# Patient Record
Sex: FEMALE | Race: WHITE | NOT HISPANIC OR LATINO
[De-identification: names, ages, dates, MRNs, and addresses within clinical notes are randomized per-mention and may not be internally consistent; named-entity substitution may affect disease eponyms.]

---

## 2018-03-28 ENCOUNTER — RECORD ABSTRACTING (OUTPATIENT)
Age: 53
End: 2018-03-28

## 2018-03-28 DIAGNOSIS — Z78.9 OTHER SPECIFIED HEALTH STATUS: ICD-10-CM

## 2018-03-28 DIAGNOSIS — Z87.898 PERSONAL HISTORY OF OTHER SPECIFIED CONDITIONS: ICD-10-CM

## 2018-03-28 DIAGNOSIS — Z87.42 PERSONAL HISTORY OF OTHER DISEASES OF THE FEMALE GENITAL TRACT: ICD-10-CM

## 2018-03-28 DIAGNOSIS — B37.3 CANDIDIASIS OF VULVA AND VAGINA: ICD-10-CM

## 2018-03-28 PROBLEM — Z00.00 ENCOUNTER FOR PREVENTIVE HEALTH EXAMINATION: Status: ACTIVE | Noted: 2018-03-28

## 2018-05-01 ENCOUNTER — APPOINTMENT (OUTPATIENT)
Dept: OBGYN | Facility: CLINIC | Age: 53
End: 2018-05-01
Payer: COMMERCIAL

## 2018-05-01 VITALS
BODY MASS INDEX: 28.17 KG/M2 | HEIGHT: 63 IN | WEIGHT: 159 LBS | DIASTOLIC BLOOD PRESSURE: 80 MMHG | SYSTOLIC BLOOD PRESSURE: 120 MMHG

## 2018-05-01 PROCEDURE — 99213 OFFICE O/P EST LOW 20 MIN: CPT | Mod: 25

## 2018-05-01 PROCEDURE — 99396 PREV VISIT EST AGE 40-64: CPT

## 2018-05-02 LAB — HPV HIGH+LOW RISK DNA PNL CVX: NOT DETECTED

## 2018-05-05 LAB — CYTOLOGY CVX/VAG DOC THIN PREP: NORMAL

## 2018-05-08 ENCOUNTER — APPOINTMENT (OUTPATIENT)
Dept: OBGYN | Facility: CLINIC | Age: 53
End: 2018-05-08

## 2018-06-05 ENCOUNTER — RECORD ABSTRACTING (OUTPATIENT)
Age: 53
End: 2018-06-05

## 2018-10-25 ENCOUNTER — OTHER (OUTPATIENT)
Age: 53
End: 2018-10-25

## 2019-01-29 ENCOUNTER — APPOINTMENT (OUTPATIENT)
Dept: FAMILY MEDICINE | Facility: CLINIC | Age: 54
End: 2019-01-29

## 2019-11-12 ENCOUNTER — APPOINTMENT (OUTPATIENT)
Dept: OBGYN | Facility: CLINIC | Age: 54
End: 2019-11-12

## 2019-11-13 ENCOUNTER — APPOINTMENT (OUTPATIENT)
Dept: MAMMOGRAPHY | Facility: CLINIC | Age: 54
End: 2019-11-13
Payer: COMMERCIAL

## 2019-11-13 PROCEDURE — 77067 SCR MAMMO BI INCL CAD: CPT

## 2019-11-13 PROCEDURE — 77063 BREAST TOMOSYNTHESIS BI: CPT

## 2020-02-04 ENCOUNTER — APPOINTMENT (OUTPATIENT)
Dept: OBGYN | Facility: CLINIC | Age: 55
End: 2020-02-04

## 2020-10-22 ENCOUNTER — APPOINTMENT (OUTPATIENT)
Dept: OBGYN | Facility: CLINIC | Age: 55
End: 2020-10-22
Payer: COMMERCIAL

## 2020-10-22 VITALS
DIASTOLIC BLOOD PRESSURE: 70 MMHG | BODY MASS INDEX: 28.35 KG/M2 | WEIGHT: 160 LBS | SYSTOLIC BLOOD PRESSURE: 118 MMHG | TEMPERATURE: 97.3 F | HEIGHT: 63 IN

## 2020-10-22 DIAGNOSIS — R10.30 LOWER ABDOMINAL PAIN, UNSPECIFIED: ICD-10-CM

## 2020-10-22 DIAGNOSIS — Z80.3 FAMILY HISTORY OF MALIGNANT NEOPLASM OF BREAST: ICD-10-CM

## 2020-10-22 LAB
DATE COLLECTED: NORMAL
HEMOCCULT SP1 STL QL: NEGATIVE
QUALITY CONTROL: YES

## 2020-10-22 PROCEDURE — 99396 PREV VISIT EST AGE 40-64: CPT

## 2020-10-22 PROCEDURE — 99072 ADDL SUPL MATRL&STAF TM PHE: CPT

## 2020-10-22 NOTE — DISCUSSION/SUMMARY
[FreeTextEntry1] : Pap, HPv\par Mammo rx given\par Pt to f/u with PCP regarding health screening\par Pt given Dr Yanez's number, based on 32% Anish kwanck risk, maternal breast cancer hx.\par RTO in 1 year or sooner prn

## 2020-10-22 NOTE — REASON FOR VISIT
[Annual] : an annual visit. [FreeTextEntry2] : pt reports perimenopause, occassional hot flashes but states they are managable

## 2020-10-22 NOTE — PHYSICAL EXAM
[Appropriately responsive] : appropriately responsive [Alert] : alert [No Acute Distress] : no acute distress [No Lymphadenopathy] : no lymphadenopathy [Regular Rate Rhythm] : regular rate rhythm [No Murmurs] : no murmurs [Clear to Auscultation B/L] : clear to auscultation bilaterally [Soft] : soft [Non-tender] : non-tender [Non-distended] : non-distended [No HSM] : No HSM [Oriented x3] : oriented x3 [Examination Of The Breasts] : a normal appearance [No Masses] : no breast masses were palpable [Labia Majora] : normal [Labia Minora] : normal [Normal] : normal [Uterine Adnexae] : normal [No Tenderness] : no tenderness [FreeTextEntry9] : prisca vivas

## 2020-10-30 LAB
CYTOLOGY CVX/VAG DOC THIN PREP: NORMAL
HPV HIGH+LOW RISK DNA PNL CVX: NOT DETECTED

## 2020-12-16 ENCOUNTER — RESULT REVIEW (OUTPATIENT)
Age: 55
End: 2020-12-16

## 2020-12-16 ENCOUNTER — APPOINTMENT (OUTPATIENT)
Dept: MAMMOGRAPHY | Facility: CLINIC | Age: 55
End: 2020-12-16
Payer: COMMERCIAL

## 2020-12-16 PROCEDURE — 77063 BREAST TOMOSYNTHESIS BI: CPT

## 2020-12-16 PROCEDURE — 77067 SCR MAMMO BI INCL CAD: CPT

## 2020-12-18 DIAGNOSIS — Z84.89 FAMILY HISTORY OF OTHER SPECIFIED CONDITIONS: ICD-10-CM

## 2021-11-14 ENCOUNTER — NON-APPOINTMENT (OUTPATIENT)
Age: 56
End: 2021-11-14

## 2021-11-15 ENCOUNTER — APPOINTMENT (OUTPATIENT)
Dept: OBGYN | Facility: CLINIC | Age: 56
End: 2021-11-15
Payer: SELF-PAY

## 2021-11-15 VITALS — SYSTOLIC BLOOD PRESSURE: 126 MMHG | DIASTOLIC BLOOD PRESSURE: 70 MMHG

## 2021-11-15 PROCEDURE — 99396 PREV VISIT EST AGE 40-64: CPT

## 2021-11-15 NOTE — PHYSICAL EXAM
[Appropriately responsive] : appropriately responsive [Alert] : alert [No Acute Distress] : no acute distress [No Lymphadenopathy] : no lymphadenopathy [Regular Rate Rhythm] : regular rate rhythm [No Murmurs] : no murmurs [Clear to Auscultation B/L] : clear to auscultation bilaterally [Soft] : soft [Non-tender] : non-tender [Non-distended] : non-distended [No HSM] : No HSM [Oriented x3] : oriented x3 [Examination Of The Breasts] : a normal appearance [Breast Nipple Inversion] : inverted [No Masses] : no breast masses were palpable [Labia Majora] : normal [Labia Minora] : normal [No Bleeding] : There was no active vaginal bleeding [Normal] : normal [Uterine Adnexae] : normal [Declined] : Patient declined rectal exam [Tenderness] : nontender

## 2021-11-15 NOTE — HISTORY OF PRESENT ILLNESS
[Patient reported mammogram was normal] : Patient reported mammogram was normal [Patient reported PAP Smear was normal] : Patient reported PAP Smear was normal [Patient reported colonoscopy was normal] : Patient reported colonoscopy was normal [No] : Patient does not have concerns regarding sex [postmenopausal] : postmenopausal [Mammogramdate] : 2020 [PapSmeardate] : 2020 [ColonoscopyDate] : 2016 [TextBox_43] : festus [LMPDate] : 2019 [[facility] : [facility] [[facility]] : 2 [FreeTextEntry1] :  x 2

## 2021-11-18 LAB
CYTOLOGY CVX/VAG DOC THIN PREP: ABNORMAL
HPV HIGH+LOW RISK DNA PNL CVX: NOT DETECTED

## 2021-12-17 ENCOUNTER — RESULT REVIEW (OUTPATIENT)
Age: 56
End: 2021-12-17

## 2021-12-17 ENCOUNTER — APPOINTMENT (OUTPATIENT)
Dept: MAMMOGRAPHY | Facility: CLINIC | Age: 56
End: 2021-12-17
Payer: COMMERCIAL

## 2021-12-17 PROCEDURE — 77063 BREAST TOMOSYNTHESIS BI: CPT

## 2021-12-17 PROCEDURE — 77067 SCR MAMMO BI INCL CAD: CPT

## 2022-12-05 ENCOUNTER — APPOINTMENT (OUTPATIENT)
Dept: OBGYN | Facility: CLINIC | Age: 57
End: 2022-12-05

## 2022-12-05 VITALS
DIASTOLIC BLOOD PRESSURE: 80 MMHG | WEIGHT: 160 LBS | HEIGHT: 63 IN | SYSTOLIC BLOOD PRESSURE: 132 MMHG | BODY MASS INDEX: 28.35 KG/M2

## 2022-12-05 DIAGNOSIS — Z01.419 ENCOUNTER FOR GYNECOLOGICAL EXAMINATION (GENERAL) (ROUTINE) W/OUT ABNORMAL FINDINGS: ICD-10-CM

## 2022-12-05 PROCEDURE — 99396 PREV VISIT EST AGE 40-64: CPT

## 2022-12-05 NOTE — HISTORY OF PRESENT ILLNESS
[Patient reported mammogram was normal] : Patient reported mammogram was normal [Patient reported PAP Smear was normal] : Patient reported PAP Smear was normal [TextBox_4] : 57 y.o.  PMF for annual exam today.  No gyn concerns today.  , not sexually active.  No hx abnormal Pap smears or mammograms [Mammogramdate] : 12/21 [PapSmeardate] : 11/21

## 2022-12-05 NOTE — PHYSICAL EXAM
[Appropriately responsive] : appropriately responsive [Alert] : alert [No Acute Distress] : no acute distress [No Lymphadenopathy] : no lymphadenopathy [Regular Rate Rhythm] : regular rate rhythm [No Murmurs] : no murmurs [Clear to Auscultation B/L] : clear to auscultation bilaterally [Soft] : soft [Non-tender] : non-tender [Non-distended] : non-distended [No Lesions] : no lesions [Oriented x3] : oriented x3 [Examination Of The Breasts] : a normal appearance [No Masses] : no breast masses were palpable [Labia Majora] : normal [Labia Minora] : normal [Normal] : normal [Uterine Adnexae] : normal

## 2022-12-05 NOTE — PLAN
[FreeTextEntry1] : unremarkable CBE and pelvic exams\par SBE taught and encouraged monthly\par Pap collected\par Mammo ordered\par I reviewed dietary, vitamin and exercise measures for maintaining optimal bone density and patient verbalizes understanding of these recommendations.\par The importance of a screening colonoscopy was discussed. Patient had negative cologuard 5 years ago\par

## 2022-12-06 LAB — HPV HIGH+LOW RISK DNA PNL CVX: NOT DETECTED

## 2022-12-09 ENCOUNTER — RESULT REVIEW (OUTPATIENT)
Age: 57
End: 2022-12-09

## 2022-12-09 ENCOUNTER — APPOINTMENT (OUTPATIENT)
Dept: MAMMOGRAPHY | Facility: CLINIC | Age: 57
End: 2022-12-09

## 2022-12-09 PROCEDURE — 77063 BREAST TOMOSYNTHESIS BI: CPT

## 2022-12-09 PROCEDURE — 77067 SCR MAMMO BI INCL CAD: CPT

## 2022-12-14 LAB — CYTOLOGY CVX/VAG DOC THIN PREP: NORMAL

## 2023-12-07 ENCOUNTER — APPOINTMENT (OUTPATIENT)
Dept: OBGYN | Facility: CLINIC | Age: 58
End: 2023-12-07
Payer: COMMERCIAL

## 2023-12-07 VITALS
SYSTOLIC BLOOD PRESSURE: 128 MMHG | HEIGHT: 63 IN | DIASTOLIC BLOOD PRESSURE: 82 MMHG | BODY MASS INDEX: 30.12 KG/M2 | WEIGHT: 170 LBS

## 2023-12-07 DIAGNOSIS — Z83.0 FAMILY HISTORY OF HUMAN IMMUNODEFICIENCY VIRUS [HIV] DISEASE: ICD-10-CM

## 2023-12-07 DIAGNOSIS — Z86.018 PERSONAL HISTORY OF OTHER BENIGN NEOPLASM: ICD-10-CM

## 2023-12-07 DIAGNOSIS — L98.9 DISORDER OF THE SKIN AND SUBCUTANEOUS TISSUE, UNSPECIFIED: ICD-10-CM

## 2023-12-07 DIAGNOSIS — Z01.419 ENCOUNTER FOR GYNECOLOGICAL EXAMINATION (GENERAL) (ROUTINE) W/OUT ABNORMAL FINDINGS: ICD-10-CM

## 2023-12-07 PROCEDURE — 99396 PREV VISIT EST AGE 40-64: CPT

## 2023-12-07 PROCEDURE — 99212 OFFICE O/P EST SF 10 MIN: CPT | Mod: 25

## 2023-12-09 LAB — HPV HIGH+LOW RISK DNA PNL CVX: NOT DETECTED

## 2023-12-16 ENCOUNTER — NON-APPOINTMENT (OUTPATIENT)
Age: 58
End: 2023-12-16

## 2023-12-17 LAB — CYTOLOGY CVX/VAG DOC THIN PREP: NORMAL

## 2024-01-02 ENCOUNTER — APPOINTMENT (OUTPATIENT)
Dept: RADIOLOGY | Facility: CLINIC | Age: 59
End: 2024-01-02
Payer: COMMERCIAL

## 2024-01-02 ENCOUNTER — APPOINTMENT (OUTPATIENT)
Dept: ULTRASOUND IMAGING | Facility: CLINIC | Age: 59
End: 2024-01-02
Payer: COMMERCIAL

## 2024-01-02 ENCOUNTER — RESULT REVIEW (OUTPATIENT)
Age: 59
End: 2024-01-02

## 2024-01-02 ENCOUNTER — APPOINTMENT (OUTPATIENT)
Dept: MAMMOGRAPHY | Facility: CLINIC | Age: 59
End: 2024-01-02
Payer: COMMERCIAL

## 2024-01-02 PROCEDURE — 77067 SCR MAMMO BI INCL CAD: CPT

## 2024-01-02 PROCEDURE — 77063 BREAST TOMOSYNTHESIS BI: CPT

## 2024-01-02 PROCEDURE — 76641 ULTRASOUND BREAST COMPLETE: CPT | Mod: 50

## 2024-01-02 PROCEDURE — 77080 DXA BONE DENSITY AXIAL: CPT

## 2024-01-04 ENCOUNTER — APPOINTMENT (OUTPATIENT)
Dept: OBGYN | Facility: CLINIC | Age: 59
End: 2024-01-04
Payer: COMMERCIAL

## 2024-01-04 VITALS
BODY MASS INDEX: 30.12 KG/M2 | HEIGHT: 63 IN | DIASTOLIC BLOOD PRESSURE: 85 MMHG | WEIGHT: 170 LBS | SYSTOLIC BLOOD PRESSURE: 119 MMHG

## 2024-01-04 DIAGNOSIS — N60.02 SOLITARY CYST OF LEFT BREAST: ICD-10-CM

## 2024-01-04 DIAGNOSIS — N84.1 POLYP OF CERVIX UTERI: ICD-10-CM

## 2024-01-04 PROCEDURE — 57500 BIOPSY OF CERVIX: CPT

## 2024-01-04 NOTE — DISCUSSION/SUMMARY
[FreeTextEntry1] : 59yo  PMF s/p cervical polypectomy and with BIRADS 3 mammo/sono and elevated TC score   Cervical polypetomy:  - Strict precautions and aftercare instructions reviewed  - Aware she cannot have anything in vagina or be submerged in water until seen by me  - RTO in 2 wks for follow up and results   Increased T-C score and need to be a apart of Dr Yanez's high risk screening program.  I explained her imaging is wnl however this program will enable her to hav e 6 month monitoring and alternating MRI and mammo/sono. She expressed understanding. I gave her the info for Dr Yanez's NP. All questions were solicited and answered.   Cammy Ovalle MD  Obstetrician/Gynecologist

## 2024-01-04 NOTE — DISCUSSION/SUMMARY
[FreeTextEntry1] : 59yo  PMF is here for annual exam with new breast skin lesion and cervical polyp.   Cervical polyp:  - RTO ASAP for polypectomy  - Aware she cannot have anything in vagina or be submerged in water for 2 wks post procedure  Breast lesion:  - Dx R mammo/Sc L mammo/ b/l breast son  - Given elevated TC score will likely need MRI  - Pt to see derm for complete skin exam   RHM:  - DVS neg x 3 - Dentist, PCP, Derm as discussed  - Colonoscopy as discussed -- pt due .  Discussed importance of following up for colon ca screening. She verbalized understanding.  - Offered STI screen today. Pt declined - Encouraged healthy diet, exercise, weight maint.   Cammy Ovalle MD  Obstetrician/Gynecologist

## 2024-01-04 NOTE — HISTORY OF PRESENT ILLNESS
[FreeTextEntry1] : 59yo  PMF is here for results review and cervical polypectomy.  Her Pap/HPV and DEXA are all wnl, her mammo/sono are BIRADS 3 with an elevated TC score of 27.2%.

## 2024-01-04 NOTE — HISTORY OF PRESENT ILLNESS
[FreeTextEntry1] : 57yo  PMF is here for annual exam and to discuss a new mole on her right side that showed up 2 months ago. She denies any other changes in her breasts. Her last mammo in Dec 2022 was BIRADS 2 however her TC score was >20%.  [Mammogramdate] : 2022 [PapSmeardate] : 2022 [BoneDensityDate] : NEVER [ColonoscopyDate] : 2023  [TextBox_43] : 4mm colonic polyp benign -- due back in 2026

## 2024-01-10 ENCOUNTER — APPOINTMENT (OUTPATIENT)
Dept: BREAST CENTER | Facility: CLINIC | Age: 59
End: 2024-01-10
Payer: COMMERCIAL

## 2024-01-10 VITALS
DIASTOLIC BLOOD PRESSURE: 68 MMHG | OXYGEN SATURATION: 99 % | BODY MASS INDEX: 30.48 KG/M2 | SYSTOLIC BLOOD PRESSURE: 124 MMHG | HEIGHT: 63 IN | HEART RATE: 70 BPM | WEIGHT: 172 LBS

## 2024-01-10 DIAGNOSIS — R92.8 OTHER ABNORMAL AND INCONCLUSIVE FINDINGS ON DIAGNOSTIC IMAGING OF BREAST: ICD-10-CM

## 2024-01-10 PROCEDURE — 99203 OFFICE O/P NEW LOW 30 MIN: CPT

## 2024-01-10 NOTE — DATA REVIEWED
[FreeTextEntry1] : 1/2/24 lilibeth Jaime sMMG and US: TC 27.2%. Scattered FG.  No suspicious mass, suspicious microcalcifications, or other sign of malignancy is identified. There is postbiopsy metallic clip right breast in stable position. There are scattered bilateral benign-appearing calcifications. US: R- no susp solid mass. L- No suspicious solid mass.  12:00 4 cm from the nipple 0.8 x 0.4 x 0.7 cm benign-appearing oval-shaped circumscribed hypoechoic nodule. Impression: No mmg or US evidence of malignancy. Left breast 12:00 4cmfn 0.8 cm benign-appearing oval-shaped circumscribed hypoechoic nodule. Rec f/u US in 6 mos to ascertain stability. Rec US in 6 mos. BR3

## 2024-01-10 NOTE — PHYSICAL EXAM
[Normocephalic] : normocephalic [Atraumatic] : atraumatic [Supple] : supple [No Supraclavicular Adenopathy] : no supraclavicular adenopathy [Examined in the supine and seated position] : examined in the supine and seated position [No dominant masses] : no dominant masses in right breast  [No Nipple Retraction] : no left nipple retraction [No Nipple Discharge] : no left nipple discharge [No Axillary Lymphadenopathy] : no left axillary lymphadenopathy [No Edema] : no edema [No Swelling] : no swelling [Full ROM] : full range of motion [No Rashes] : no rashes [No Ulceration] : no ulceration [Bra Size: ___] : Bra Size: [unfilled] [de-identified] : bilateral nipple flattening, chronic per patient. [de-identified] : Slightly palpable dense area corresponds to u/s findings at 12:00 4 N.  [TextEntry] : Psych: appropriate, A&Ox3 Neuro: intact grossly, gait normal

## 2024-01-10 NOTE — HISTORY OF PRESENT ILLNESS
[FreeTextEntry1] : Referred by Dr. Cammy Ovalle  Patient is a 58-year-old female here today for consultation after recent US was BR3. Pt denies any breast lesions, discharge or masses, though she does note pain during US at L 12:00 where hypoechoic nodule noted.  She states within last year benign polyp found on colonoscopy and recent cervical polyp biopsies by Dr. Ovalle, path pending.   1/2/24 Yeni, bilat sMMG and US: TC 27.2%. Scattered FG.  No suspicious mass, suspicious microcalcifications, or other sign of malignancy is identified. There is postbiopsy metallic clip right breast in stable position. There are scattered bilateral benign-appearing calcifications. US: R- no susp solid mass. L- No suspicious solid mass.  12:00 4 cm from the nipple 0.8 x 0.4 x 0.7 cm benign-appearing oval-shaped circumscribed hypoechoic nodule. Impression: No mmg or US evidence of malignancy. Left breast 12:00 4cmfn 0.8 cm benign-appearing oval-shaped circumscribed hypoechoic nodule. Rec f/u US in 6 mos to ascertain stability. Rec US in 6 mos. BR3  Fhx: Breast- mother age 55 (1/3). Gallbladder?- father ager 52. Brother with hx if hep C, liver malignancy.

## 2024-01-10 NOTE — ASSESSMENT
[FreeTextEntry1] : Referred by Dr. Cammy Ovalle  Patient is a 58-year-old female here today for consultation after recent US was BR3. Pt denies any breast lesions, discharge or masses, though she does note pain during US at L 12:00 where hypoechoic nodule noted.  She states within last year benign polyp found on colonoscopy and recent cervical polyp biopsies by Dr. Ovalle, path pending.   24 Yeni, bilhenny sMMG and US: TC 27.2%. Scattered FG.  No suspicious mass, suspicious microcalcifications, or other sign of malignancy is identified. There is postbiopsy metallic clip right breast in stable position. There are scattered bilateral benign-appearing calcifications. US: R- no susp solid mass. L- No suspicious solid mass.  12:00 4 cm from the nipple 0.8 x 0.4 x 0.7 cm benign-appearing oval-shaped circumscribed hypoechoic nodule. Impression: No mmg or US evidence of malignancy. Left breast 12:00 4cmfn 0.8 cm benign-appearing oval-shaped circumscribed hypoechoic nodule. Rec f/u US in 6 mos to ascertain stability. Rec US in 6 mos. BR3  Fhx: Breast- mother age 55 (1/3) , Gallbladder?- father ager 52. Brother with hx if hep C, liver malignancy. Not AJ.    CBE: Bilateral chronic nipple flattening. Right negative. Slightly palpable dense area corresponds to u/s findings at 12:00 4 N. No axillary or SC adenopathy. Reviewed CBE and images with patient. Option for 6 mos f/u given BR3, <3% chance of malignancy vs biopsy presented. Patient opts for biopsy. Will submit to radiology for review and biopsy if possible, further management pending biopsy results. TC recalculated and 23.5%, she does meet criteria for HRP, if biopsy wnl then MRI in 6 mos.   PLAN: L 12:00 biopsy, further management pending results If biopsy wnl, MRI in 6 mos for HRP

## 2024-01-10 NOTE — PAST MEDICAL HISTORY
[Total Preg ___] : G[unfilled] [Live Births ___] : P[unfilled]  [Living ___] : Living: [unfilled] [Age At Live Birth ___] : Age at live birth: [unfilled] [Postmenopausal] : The patient is postmenopausal [Menarche Age ____] : age at menarche was [unfilled] [Menopause Age____] : age at menopause was [unfilled] [FreeTextEntry5] : None [FreeTextEntry6] : None [FreeTextEntry7] : OCP  [FreeTextEntry8] : None

## 2024-01-18 ENCOUNTER — NON-APPOINTMENT (OUTPATIENT)
Age: 59
End: 2024-01-18

## 2024-01-18 ENCOUNTER — APPOINTMENT (OUTPATIENT)
Dept: ULTRASOUND IMAGING | Facility: CLINIC | Age: 59
End: 2024-01-18
Payer: COMMERCIAL

## 2024-01-18 ENCOUNTER — RESULT REVIEW (OUTPATIENT)
Age: 59
End: 2024-01-18

## 2024-01-18 PROCEDURE — A4648: CPT

## 2024-01-18 PROCEDURE — 77065 DX MAMMO INCL CAD UNI: CPT | Mod: LT

## 2024-01-18 PROCEDURE — 19083 BX BREAST 1ST LESION US IMAG: CPT | Mod: LT

## 2024-01-23 ENCOUNTER — APPOINTMENT (OUTPATIENT)
Dept: OBGYN | Facility: CLINIC | Age: 59
End: 2024-01-23
Payer: COMMERCIAL

## 2024-01-23 VITALS
WEIGHT: 170 LBS | HEIGHT: 63 IN | BODY MASS INDEX: 30.12 KG/M2 | DIASTOLIC BLOOD PRESSURE: 83 MMHG | SYSTOLIC BLOOD PRESSURE: 151 MMHG

## 2024-01-23 LAB — CORE LAB BIOPSY: NORMAL

## 2024-01-23 PROCEDURE — 99212 OFFICE O/P EST SF 10 MIN: CPT

## 2024-01-23 NOTE — DISCUSSION/SUMMARY
[FreeTextEntry1] : 59yo  PMF s/p uncomplicated endometrial and endocervical polypectomy with benign results. Pt declined results today.   - ok to resume all life activities  - All questions were solicited and answered   Cammy Ovalle MD  Obstetrician/Gynecologist

## 2024-01-23 NOTE — HISTORY OF PRESENT ILLNESS
[FreeTextEntry1] : 59yo  PMF is here for results review after an uncomplicated cervical polypectomy. Her results are benign revealing a mixed endocervical and endometrial polyp. She had no issues post procedure. She also had a breast bx on  and no issues after that as well. She is awaiting results.

## 2024-01-24 ENCOUNTER — TRANSCRIPTION ENCOUNTER (OUTPATIENT)
Age: 59
End: 2024-01-24

## 2024-01-24 ENCOUNTER — NON-APPOINTMENT (OUTPATIENT)
Age: 59
End: 2024-01-24

## 2024-01-24 DIAGNOSIS — Z91.89 OTHER SPECIFIED PERSONAL RISK FACTORS, NOT ELSEWHERE CLASSIFIED: ICD-10-CM

## 2025-08-26 ENCOUNTER — APPOINTMENT (OUTPATIENT)
Dept: MAMMOGRAPHY | Facility: CLINIC | Age: 60
End: 2025-08-26
Payer: COMMERCIAL

## 2025-08-26 PROCEDURE — 77067 SCR MAMMO BI INCL CAD: CPT

## 2025-08-26 PROCEDURE — 77063 BREAST TOMOSYNTHESIS BI: CPT
